# Patient Record
Sex: FEMALE | Race: WHITE
[De-identification: names, ages, dates, MRNs, and addresses within clinical notes are randomized per-mention and may not be internally consistent; named-entity substitution may affect disease eponyms.]

---

## 2020-05-31 ENCOUNTER — HOSPITAL ENCOUNTER (EMERGENCY)
Dept: HOSPITAL 76 - ED | Age: 20
Discharge: HOME | End: 2020-05-31
Payer: MEDICAID

## 2020-05-31 VITALS — SYSTOLIC BLOOD PRESSURE: 130 MMHG | DIASTOLIC BLOOD PRESSURE: 90 MMHG

## 2020-05-31 DIAGNOSIS — S61.250A: Primary | ICD-10-CM

## 2020-05-31 DIAGNOSIS — L03.011: ICD-10-CM
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
## 2020-05-31 PROCEDURE — 26010 DRAINAGE OF FINGER ABSCESS: CPT

## 2020-05-31 NOTE — ED PHYSICIAN DOCUMENTATION
History of Present Illness





- Stated complaint


Stated Complaint: CAT BITE





- Chief complaint


Chief Complaint: Wound





- History obtained from


History obtained from: Patient





- History of Present Illness


Timing: Yesterday


Pain level max: 5


Pain level now: 4





- Additonal information


Additional information: 





20-year-old female presents to the emergency department stating that she was bit

in the right index finger yesterday by her cat.  Increased swelling redness 

today.  Went to the clinic this morning and was started on Augmentin.  Tetanus 

is up-to-date.  She states redness and swelling have increased since that time. 

No fevers.





Review of Systems


Constitutional: denies: Fever, Chills


: denies: Now pregnant EGA


Skin: denies: Rash





PD PAST MEDICAL HISTORY





- Past Medical History


Past Medical History: Yes


GI: GERD





- Past Surgical History


Past Surgical History: No





- Present Medications


Home Medications: 


                                Ambulatory Orders











 Medication  Instructions  Recorded  Confirmed


 


Norgestimate-Ethinyl Estradiol 1 each PO 02/26/14 02/26/14





[Ortho Tri-Cyclen Lo]   


 


Acyclovir 200 mg PO 05/31/20 


 


Amox/Clav 875/125 [Augmentin] 1 each PO ONCE 05/31/20 05/31/20














- Allergies


Allergies/Adverse Reactions: 


                                    Allergies











Allergy/AdvReac Type Severity Reaction Status Date / Time


 


acetaminophen [From Vicodin] Allergy  Rash Verified 05/31/20 18:59


 


hydrocodone bitartrate * Allergy  Rash Verified 05/31/20 18:59





[From Vicodin]     


 


Sulfa (Sulfonamide Allergy  Rash Verified 05/31/20 18:59





Antibiotics)     














- Social History


Does the pt smoke?: No


Smoking Status: Never smoker


Does the pt drink ETOH?: No


Does the pt have substance abuse?: No





- Immunizations


Immunizations are current?: Yes





- POLST


Patient has POLST: No





PD ED PE NORMAL





- Vitals


Vital signs reviewed: Yes





- General


General: Alert and oriented X 3, No acute distress





- HEENT


HEENT: Moist mucous membranes





- Neck


Neck: Supple, no meningeal sign





- Derm


Derm: Warm and dry





- Extremities


Extremities: Other (R index  finger - swelling and erythema to the prox phalanx 

of the finger. No palmar tenderness. no pain with extension of the finger. )





- Neuro


Neuro: Alert and oriented X 3





Results





- Vitals


Vitals: 


                               Vital Signs - 24 hr











  05/31/20 05/31/20





  18:56 20:06


 


Temperature 36.7 C 36.6 C


 


Heart Rate 104 H 92


 


Respiratory 16 16





Rate  


 


Blood Pressure 151/106 H 130/90 H


 


O2 Saturation 99 100








                                     Oxygen











O2 Source                      Room air

















PD MEDICAL DECISION MAKING





- ED course


Complexity details: considered differential, d/w patient


ED course: 





Small pustule was visible on the finger.  The finger was anesthetized and this 

was removed.  Slight purulence drained.  Ultrasound performed.  No deep abscess 

visible.  Tendon sheath appears normal.  Given Rocephin.  Will continue the 

Augmentin.  We will have her rechecked if she is not improved by tomorrow.  

Patient counseled regarding signs and symptoms for which I believe and urgent 

re-evaluation would be necessary. Patient with good understanding of and 

agreement to plan and is comfortable going home at this time





This document was made in part using voice recognition software. While efforts 

are made to proofread this document, sound alike and grammatical errors may 

occur.





Departure





- Departure


Disposition: 01 Home, Self Care


Clinical Impression: 


Cat bite


Qualifiers:


 Encounter type: initial encounter Qualified Code(s): W55.01XA - Bitten by cat, 

initial encounter





Cellulitis


Qualifiers:


 Site of cellulitis: extremity Site of cellulitis of extremity: finger 

Laterality: right Qualified Code(s): L03.011 - Cellulitis of right finger





Condition: Good


Instructions:  ED Bite Animal General, ED Infec Skin Cellulitis


Follow-Up: 


Marilia Tavera PA [Primary Care Provider] - Within 3 Days


Comments: 


Continue the Augmentin at home.  This should start decreasing by tomorrow.  If 

it is worsening or continuing to spread, please return to the emergency 

department for repeat evaluation.


Discharge Date/Time: 05/31/20 20:06

## 2022-04-12 ENCOUNTER — HOSPITAL ENCOUNTER (EMERGENCY)
Dept: HOSPITAL 76 - ED | Age: 22
Discharge: HOME | End: 2022-04-12
Payer: MEDICAID

## 2022-04-12 VITALS — SYSTOLIC BLOOD PRESSURE: 169 MMHG | DIASTOLIC BLOOD PRESSURE: 134 MMHG

## 2022-04-12 DIAGNOSIS — R10.31: Primary | ICD-10-CM

## 2022-04-12 LAB
ALBUMIN DIAFP-MCNC: 4.8 G/DL (ref 3.2–5.5)
ALBUMIN/GLOB SERPL: 1.3 {RATIO} (ref 1–2.2)
ALP SERPL-CCNC: 78 IU/L (ref 42–121)
ALT SERPL W P-5'-P-CCNC: 15 IU/L (ref 10–60)
ANION GAP SERPL CALCULATED.4IONS-SCNC: 13 MMOL/L (ref 6–13)
AST SERPL W P-5'-P-CCNC: 24 IU/L (ref 10–42)
BASOPHILS NFR BLD AUTO: 0.1 10^3/UL (ref 0–0.1)
BASOPHILS NFR BLD AUTO: 0.4 %
BILIRUB BLD-MCNC: 1 MG/DL (ref 0.2–1)
BUN SERPL-MCNC: 8 MG/DL (ref 6–20)
CALCIUM UR-MCNC: 9.6 MG/DL (ref 8.5–10.3)
CHLORIDE SERPL-SCNC: 100 MMOL/L (ref 101–111)
CLARITY UR REFRACT.AUTO: (no result)
CO2 SERPL-SCNC: 24 MMOL/L (ref 21–32)
CREAT SERPLBLD-SCNC: 0.8 MG/DL (ref 0.4–1)
EOSINOPHIL # BLD AUTO: 0 10^3/UL (ref 0–0.7)
EOSINOPHIL NFR BLD AUTO: 0.1 %
ERYTHROCYTE [DISTWIDTH] IN BLOOD BY AUTOMATED COUNT: 13.4 % (ref 12–15)
GFRSERPLBLD MDRD-ARVRAT: 88 ML/MIN/{1.73_M2} (ref 89–?)
GLOBULIN SER-MCNC: 3.8 G/DL (ref 2.1–4.2)
GLUCOSE SERPL-MCNC: 118 MG/DL (ref 70–100)
GLUCOSE UR QL STRIP.AUTO: NEGATIVE MG/DL
HCG UR QL: NEGATIVE
HCT VFR BLD AUTO: 42.9 % (ref 37–47)
HGB UR QL STRIP: 14 G/DL (ref 12–16)
KETONES UR QL STRIP.AUTO: >=80 MG/DL
LIPASE SERPL-CCNC: 34 U/L (ref 22–51)
LYMPHOCYTES # SPEC AUTO: 2.5 10^3/UL (ref 1.5–3.5)
LYMPHOCYTES NFR BLD AUTO: 17.7 %
MCH RBC QN AUTO: 29.4 PG (ref 27–31)
MCHC RBC AUTO-ENTMCNC: 32.6 G/DL (ref 32–36)
MCV RBC AUTO: 89.9 FL (ref 81–99)
MONOCYTES # BLD AUTO: 0.9 10^3/UL (ref 0–1)
MONOCYTES NFR BLD AUTO: 6.3 %
MUCOUS THREADS URNS QL MICRO: (no result)
NEUTROPHILS # BLD AUTO: 10.7 10^3/UL (ref 1.5–6.6)
NEUTROPHILS # SNV AUTO: 14.2 X10^3/UL (ref 4.8–10.8)
NEUTROPHILS NFR BLD AUTO: 75.1 %
NITRITE UR QL STRIP.AUTO: NEGATIVE
NRBC # BLD AUTO: 0 /100WBC
NRBC # BLD AUTO: 0 X10^3/UL
PDW BLD AUTO: 9.8 FL (ref 7.9–10.8)
PH UR STRIP.AUTO: 7 PH (ref 5–7.5)
PLATELET # BLD: 445 10^3/UL (ref 130–450)
POTASSIUM SERPL-SCNC: 3.2 MMOL/L (ref 3.5–5)
PROT SPEC-MCNC: 8.6 G/DL (ref 6.7–8.2)
PROT UR STRIP.AUTO-MCNC: (no result) MG/DL
RBC # UR STRIP.AUTO: (no result) /UL
RBC # URNS HPF: (no result) /HPF (ref 0–5)
RBC MAR: 4.77 10^6/UL (ref 4.2–5.4)
SODIUM SERPLBLD-SCNC: 137 MMOL/L (ref 135–145)
SP GR UR STRIP.AUTO: 1.02 (ref 1–1.03)
SQUAMOUS #/AREA URNS HPF: (no result) /HPF
SQUAMOUS URNS QL MICRO: (no result)
UROBILINOGEN UR QL STRIP.AUTO: (no result) E.U./DL
UROBILINOGEN UR STRIP.AUTO-MCNC: NEGATIVE MG/DL
WBC # UR MANUAL: (no result) /HPF (ref 0–5)

## 2022-04-12 PROCEDURE — 81003 URINALYSIS AUTO W/O SCOPE: CPT

## 2022-04-12 PROCEDURE — 83690 ASSAY OF LIPASE: CPT

## 2022-04-12 PROCEDURE — 74177 CT ABD & PELVIS W/CONTRAST: CPT

## 2022-04-12 PROCEDURE — 99282 EMERGENCY DEPT VISIT SF MDM: CPT

## 2022-04-12 PROCEDURE — 80053 COMPREHEN METABOLIC PANEL: CPT

## 2022-04-12 PROCEDURE — 96361 HYDRATE IV INFUSION ADD-ON: CPT

## 2022-04-12 PROCEDURE — 81001 URINALYSIS AUTO W/SCOPE: CPT

## 2022-04-12 PROCEDURE — 87086 URINE CULTURE/COLONY COUNT: CPT

## 2022-04-12 PROCEDURE — 99284 EMERGENCY DEPT VISIT MOD MDM: CPT

## 2022-04-12 PROCEDURE — 81025 URINE PREGNANCY TEST: CPT

## 2022-04-12 PROCEDURE — 36415 COLL VENOUS BLD VENIPUNCTURE: CPT

## 2022-04-12 PROCEDURE — 85025 COMPLETE CBC W/AUTO DIFF WBC: CPT

## 2022-04-12 PROCEDURE — 96374 THER/PROPH/DIAG INJ IV PUSH: CPT

## 2022-04-12 NOTE — ED PHYSICIAN DOCUMENTATION
PD HPI ABD PAIN





- Stated complaint


Stated Complaint: ABD PX,NAUSEA,SHAKING





- Chief complaint


Chief Complaint: Abd Pain





- History obtained from


History obtained from: Patient





- History of Present Illness


Timing - details: Gradual onset


Pain level max: 8


Pain level now: 6


Quality: Aching, Pain


Radiation: No: Chest, , Lower back, Left flank, Left shoulder, Right flank, 

Right shoulder, Upper back


Associated symptoms: No: Fever, Nausea, Vomiting, Hematemesis, Diarrhea, 

Constipation, Melena, Hematochezia, Dysuria, Hematuria, Chest pain, Dizzy, Near 

syncope / syncope, Loss of appetite, Weight loss, Vaginal dc





- Additional information


Additional information: 





Patient is a 22-year-old female who presents to the emergency department with 

right lower quadrant abdominal pain that started last night and has continued 

today.  Worse with movement, better with rest.  Worse with palpation.  

Nonradiating.  She states she started her menses 2 days ago.  Denies any 

possibility of pregnancy.  No urinary symptoms. Patient states that it started 

after eating Mexican food, had vomiting last night.





Review of Systems


Ten Systems: 10 systems reviewed and negative


Constitutional: denies: Fever, Chills


Nose: denies: Rhinorrhea / runny nose, Congestion


Throat: denies: Sore throat


Respiratory: denies: Dyspnea, Cough


: denies: Dysuria, Frequency, Hesitancy, Now pregnant EGA


Skin: denies: Rash


Musculoskeletal: denies: Neck pain, Back pain


Neurologic: denies: Headache





PD PAST MEDICAL HISTORY





- Past Medical History


Past Medical History: Yes


Cardiovascular: None


Respiratory: None


Neuro: None


Endocrine/Autoimmune: None


GI: GERD


GYN: None


: None


HEENT: None


Psych: None


Musculoskeletal: None


Derm: None





- Past Surgical History


Past Surgical History: No





- Present Medications


Home Medications: 


                                Ambulatory Orders











 Medication  Instructions  Recorded  Confirmed


 


Norgestimate-Ethinyl Estradiol 1 each PO DAILY 02/26/14 04/12/22





[Ortho Tri-Cyclen Lo]   


 


Acyclovir 200 mg PO PRN PRN 05/31/20 04/12/22


 


Amox/Clav 875/125 [Augmentin] 1 tab PO Q12H #20 tablet 04/12/22 


 


Ondansetron Odt [Zofran] 4 mg TL Q6H PRN #10 tablet 04/12/22 


 


Oxycodone HCl/Acetaminophen 1 - 2 each PO Q6H PRN #14 tablet 04/12/22 





[Percocet 5-325 mg Tablet]   














- Allergies


Allergies/Adverse Reactions: 


                                    Allergies











Allergy/AdvReac Type Severity Reaction Status Date / Time


 


acetaminophen [From Vicodin] Allergy  Rash Verified 04/12/22 12:24


 


hydrocodone bitartrate * Allergy  Rash Verified 04/12/22 12:24





[From Vicodin]     


 


Sulfa (Sulfonamide Allergy  Rash Verified 04/12/22 12:24





Antibiotics)     














- Social History


Does the pt smoke?: No


Smoking Status: Never smoker


Does the pt drink ETOH?: No


Does the pt have substance abuse?: Yes


Substance Use and Type: Marijuana





- Immunizations


Immunizations are current?: Yes





- POLST


Patient has POLST: No





PD ED PE NORMAL





- Vitals


Vital signs reviewed: Yes





- General


General: Alert and oriented X 3, No acute distress, Well developed/nourished





- HEENT


HEENT: PERRL, Moist mucous membranes





- Neck


Neck: Supple, no meningeal sign





- Cardiac


Cardiac: RRR, Strong equal pulses





- Respiratory


Respiratory: No respiratory distress, Clear bilaterally





- Abdomen


Abdomen: Soft, Non distended, Other (Tender to palpation right lower quadrant 

near McBurney's point.  No peritoneal signs.  Negative Rovsing, psoas, obturator

signs.. Negative heeltap)





- Derm


Derm: Warm and dry





- Extremities


Extremities: No deformity





- Neuro


Neuro: Alert and oriented X 3





- Psych


Psych: Normal mood, Normal affect





Results





- Vitals


Vitals: 


                               Vital Signs - 24 hr











  04/12/22 04/12/22 04/12/22





  12:25 14:00 16:00


 


Temperature 36.9 C 36.6 C 36.6 C


 


Heart Rate 114 H 97 70


 


Respiratory 20 16 16





Rate   


 


Blood Pressure 154/123 H 169/148 H 169/134 H


 


O2 Saturation 100 100 99








                                     Oxygen











O2 Source                      Room air

















- Labs


Labs: 


                                Laboratory Tests











  04/12/22 04/12/22 04/12/22





  12:38 12:38 12:44


 


WBC    14.2 H


 


RBC    4.77


 


Hgb    14.0


 


Hct    42.9


 


MCV    89.9


 


MCH    29.4


 


MCHC    32.6


 


RDW    13.4


 


Plt Count    445


 


MPV    9.8


 


Neut # (Auto)    10.7 H


 


Lymph # (Auto)    2.5


 


Mono # (Auto)    0.9


 


Eos # (Auto)    0.0


 


Baso # (Auto)    0.1


 


Absolute Nucleated RBC    0.00


 


Nucleated RBC %    0.0


 


Sodium   


 


Potassium   


 


Chloride   


 


Carbon Dioxide   


 


Anion Gap   


 


BUN   


 


Creatinine   


 


Estimated GFR (MDRD)   


 


Glucose   


 


Calcium   


 


Total Bilirubin   


 


AST   


 


ALT   


 


Alkaline Phosphatase   


 


Total Protein   


 


Albumin   


 


Globulin   


 


Albumin/Globulin Ratio   


 


Lipase   


 


Urine Color   YELLOW 


 


Urine Clarity   HAZY 


 


Urine pH   7.0 


 


Ur Specific Gravity   1.020 


 


Urine Protein   TRACE 


 


Urine Glucose (UA)   NEGATIVE 


 


Urine Ketones   >=80 H 


 


Urine Occult Blood   LARGE H 


 


Urine Nitrite   NEGATIVE 


 


Urine Bilirubin   NEGATIVE 


 


Urine Urobilinogen   0.2 (NORMAL) 


 


Ur Leukocyte Esterase   NEGATIVE 


 


Urine RBC   0-5 


 


Urine WBC   0-3 


 


Ur Epithelial Cells   FEW Transitional 


 


Ur Squamous Epith Cells   FEW Squamous 


 


Urine Bacteria   Few 


 


Urine Mucus   Marked Strands 


 


Ur Microscopic Review   INDICATED 


 


Urine Culture Comments   NOT INDICATED 


 


Urine HCG, Qual  NEGATIVE  














  04/12/22





  12:44


 


WBC 


 


RBC 


 


Hgb 


 


Hct 


 


MCV 


 


MCH 


 


MCHC 


 


RDW 


 


Plt Count 


 


MPV 


 


Neut # (Auto) 


 


Lymph # (Auto) 


 


Mono # (Auto) 


 


Eos # (Auto) 


 


Baso # (Auto) 


 


Absolute Nucleated RBC 


 


Nucleated RBC % 


 


Sodium  137


 


Potassium  3.2 L


 


Chloride  100 L


 


Carbon Dioxide  24


 


Anion Gap  13.0


 


BUN  8


 


Creatinine  0.8


 


Estimated GFR (MDRD)  88 L


 


Glucose  118 H


 


Calcium  9.6


 


Total Bilirubin  1.0


 


AST  24


 


ALT  15


 


Alkaline Phosphatase  78


 


Total Protein  8.6 H


 


Albumin  4.8


 


Globulin  3.8


 


Albumin/Globulin Ratio  1.3


 


Lipase  34


 


Urine Color 


 


Urine Clarity 


 


Urine pH 


 


Ur Specific Gravity 


 


Urine Protein 


 


Urine Glucose (UA) 


 


Urine Ketones 


 


Urine Occult Blood 


 


Urine Nitrite 


 


Urine Bilirubin 


 


Urine Urobilinogen 


 


Ur Leukocyte Esterase 


 


Urine RBC 


 


Urine WBC 


 


Ur Epithelial Cells 


 


Ur Squamous Epith Cells 


 


Urine Bacteria 


 


Urine Mucus 


 


Ur Microscopic Review 


 


Urine Culture Comments 


 


Urine HCG, Qual 














- Rads (name of study)


  ** CT abdomen pelvis


Radiology: Final report received, EMP read contemporaneously, See rad report





PD MEDICAL DECISION MAKING





- ED course


Complexity details: reviewed results, re-evaluated patient, considered 

differential, d/w patient, d/w family, d/w consultant


ED course: 





22-year-old female with abdominal pain.  Unclear etiology.  Mild leukocytosis.  

Had vomiting last night.  Pain well controlled in the emergency department.  

Tolerating p.o. without difficulty.  There is no right lower quadrant 

inflammatory change, appendix not definitively visualized.  Discussed the case 

with Dr. Pabon, general surgery on-call, Reviewed all labs and radiographic 

images, she recommends placing the patient on oral antibiotics and having her 

follow-up in 24 to 48 hours if she is not improved.  Patient is well-appearing, 

nontoxic.  Afebrile.  Possible gastroenteritis?  Appendicitis precautions given 

at bedside.  Patient counseled regarding signs and symptoms for which I believe 

and urgent re-evaluation would be necessary. Patient with good understanding of 

and agreement to plan and is comfortable going home at this time





This document was made in part using voice recognition software. While efforts 

are made to proofread this document, sound alike and grammatical errors may 

occur.














IMPRESSION: 





Appendix is not visualized. However, no right lower quadrant inflammatory 

change. 





No bowel obstruction. No free fluid or free air. 





Departure





- Departure


Disposition: 01 Home, Self Care


Clinical Impression: 


Abdominal pain


Qualifiers:


 Abdominal location: right lower quadrant Qualified Code(s): R10.31 - Right 

lower quadrant pain





Condition: Good


Instructions:  ED Abdominal Pain Appendx Poss


Follow-Up: 


SHAHLA BERUMEN ARNP [Primary Care Provider] - Within 3 Days


Prescriptions: 


Amox/Clav 875/125 [Augmentin] 1 tab PO Q12H #20 tablet


Oxycodone HCl/Acetaminophen [Percocet 5-325 mg Tablet] 1 - 2 each PO Q6H PRN #14

tablet


 PRN Reason: pain


Ondansetron Odt [Zofran] 4 mg TL Q6H PRN #10 tablet


 PRN Reason: Nausea / Vomiting


Comments: 


Your prescriptions were sent to [facility] in [place].  You should start to 

improve within the next 24 hours, if you develop fevers, worsening pain or 

worsening symptoms, please return for further evaluation.  As we discussed your 

appendix was not seen clearly on CT scan, but they do not see secondary signs of

 appendicitis.  I did speak with the general surgeon on-call, Dr. Johnson as well. 

We will trial you on antibiotics for home to see if your symptoms resolve.








I am prescribing a short course of narcotic pain medication for you. These are 

potentially dangerous and addictive medications that should be used carefully. 


These medications may constipate you. Take an over-the-counter stool softener 

(docusate) twice daily with plenty of water while taking these medications. If 

you go 24 hours without a bowel movement, take over-the-counter miralax, per 

package instructions.


Do not drink or drive while taking these medications. 


If you received narcotic or sedating medications while in the emergency 

department, do not drive for 24 hours.


Store this medication in a safe, secure place and out of reach of children. 


It is a violation of federal law to give or sell this medication to another 

person or to use in a manner other than prescribed.


The ED will not refill narcotic prescriptions, including prescriptions lost or 

stolen.


To dispose of unwanted medications:


1. [facility] at [address] in 

[place] has a medication drop box. They accept prescription medications (in 

pill form) Monday through Friday 9:00 a.m. to 5:00 p.m. 


2. [facility] [employer] accepts prescription medications (in

 pill form only) for disposal year round. Call [phone] for more 

information. 


3. Contact the [facility] for the next [facility] sponsored prescription 

drug collection event. [phone], [phone], or [phone] 

[phone];


Discharge Date/Time: 04/12/22 16:38

## 2022-04-12 NOTE — CT REPORT
PROCEDURE:  Abdomen/Pelvis W

 

INDICATIONS:  RLQ abd pain

 

CONTRAST:  IV CONTRAST: Optiray 320 ml: 100 PO CONTRAST: *NO PO CONTRAST  

 

TECHNIQUE:  

After the administration of IV contrast, 5 mm thick sections acquired from the diaphragms to the symp
hysis.  5 mm thick coronal and sagittal reformats were acquired.  For radiation dose reduction, the f
ollowing was used:  automated exposure control, adjustment of mA and/or kV according to patient size.
  

 

COMPARISON:  None.

 

FINDINGS:  

Image quality:  Excellent.  

 

ABDOMEN:  

Lung bases:  Lung bases are clear.  Heart size is normal.  

 

Solid organs:  Liver and spleen are normal in size and enhancement. Mild steatosis is present. Gallbl
adder is unremarkable  Biliary system is non dilated.  Pancreas enhances normally.  No adrenal nodule
s.  Kidneys demonstrate normal size and enhancement, without hydronephrosis.  

 

Peritoneum and bowel:  Bowel loops demonstrate normal wall thickness and caliber.  No free fluid or a
ir.  The appendix is not definitively identified. No right lower quadrant inflammatory change.

 

Nodes and vessels:  No retroperitoneal or mesenteric adenopathy by size criteria.  Aorta and inferior
 vena cava are normal in size.  

 

Miscellaneous:  No ventral hernias.  

 

 

PELVIS:  

Genitourinary:  Bladder wall thickness is normal.  

 

Miscellaneous:  No inguinal hernias or adenopathy.  

 

Bones:  No suspicious bony lesions.  No vertebral body compression fractures.  

 

IMPRESSION:  

 

Appendix is not visualized. However, no right lower quadrant inflammatory change.

 

No bowel obstruction. No free fluid or free air.

 

Reviewed by: Jasmin Paulino MD on 4/12/2022 3:38 PM PDT

Approved by: Jasmin Paulino MD on 4/12/2022 3:38 PM PDT

 

 

Station ID:  [id number]

## 2023-07-03 ENCOUNTER — HOSPITAL ENCOUNTER (OUTPATIENT)
Dept: HOSPITAL 76 - DI.S | Age: 23
Discharge: HOME | End: 2023-07-03
Attending: PHYSICIAN ASSISTANT
Payer: MEDICAID

## 2023-07-03 DIAGNOSIS — S90.32XA: Primary | ICD-10-CM

## 2023-07-03 NOTE — XRAY REPORT
PROCEDURE:  Foot 3 View LT

 

INDICATIONS:  CONTUSION OF LEFT FOOT

 

TECHNIQUE:  3 views of the foot were acquired.  

 

COMPARISON:  None.

 

FINDINGS:  

 

Bones:  No fractures or dislocations.  No suspicious bony lesions.  

 

Soft tissues:  No suspicious soft tissue calcifications or masses.  

 

 

IMPRESSION:  

No acute bony abnormality.

 

 

 

Reviewed by: Andres Guo MD on 7/3/2023 4:09 PM PDT

Approved by: Andres Guo MD on 7/3/2023 4:09 PM PDT

 

 

Station ID:  [id number]